# Patient Record
Sex: FEMALE | NOT HISPANIC OR LATINO | Employment: OTHER
[De-identification: names, ages, dates, MRNs, and addresses within clinical notes are randomized per-mention and may not be internally consistent; named-entity substitution may affect disease eponyms.]

---

## 2017-09-17 ENCOUNTER — HEALTH MAINTENANCE LETTER (OUTPATIENT)
Age: 62
End: 2017-09-17

## 2022-10-12 ENCOUNTER — OFFICE VISIT (OUTPATIENT)
Dept: FAMILY MEDICINE | Facility: CLINIC | Age: 67
End: 2022-10-12

## 2022-10-12 VITALS
HEIGHT: 66 IN | DIASTOLIC BLOOD PRESSURE: 71 MMHG | TEMPERATURE: 97.5 F | BODY MASS INDEX: 34.27 KG/M2 | WEIGHT: 213.2 LBS | OXYGEN SATURATION: 96 % | HEART RATE: 67 BPM | RESPIRATION RATE: 16 BRPM | SYSTOLIC BLOOD PRESSURE: 113 MMHG

## 2022-10-12 DIAGNOSIS — R73.03 PREDIABETES: ICD-10-CM

## 2022-10-12 DIAGNOSIS — Z00.00 ENCOUNTER FOR MEDICARE ANNUAL WELLNESS EXAM: Primary | ICD-10-CM

## 2022-10-12 DIAGNOSIS — M79.7 FIBROMYALGIA: ICD-10-CM

## 2022-10-12 DIAGNOSIS — Z11.59 NEED FOR HEPATITIS C SCREENING TEST: ICD-10-CM

## 2022-10-12 DIAGNOSIS — M85.80 OSTEOPENIA, UNSPECIFIED LOCATION: ICD-10-CM

## 2022-10-12 DIAGNOSIS — Z17.0 MALIGNANT NEOPLASM OF OVERLAPPING SITES OF RIGHT BREAST IN FEMALE, ESTROGEN RECEPTOR POSITIVE (H): ICD-10-CM

## 2022-10-12 DIAGNOSIS — M17.0 PRIMARY OSTEOARTHRITIS OF BOTH KNEES: ICD-10-CM

## 2022-10-12 DIAGNOSIS — C50.811 MALIGNANT NEOPLASM OF OVERLAPPING SITES OF RIGHT BREAST IN FEMALE, ESTROGEN RECEPTOR POSITIVE (H): ICD-10-CM

## 2022-10-12 DIAGNOSIS — I70.0 ATHEROSCLEROSIS OF AORTA (H): ICD-10-CM

## 2022-10-12 PROBLEM — E04.1 THYROID NODULE: Status: ACTIVE | Noted: 2017-03-16

## 2022-10-12 PROCEDURE — G0009 ADMIN PNEUMOCOCCAL VACCINE: HCPCS | Mod: 59 | Performed by: FAMILY MEDICINE

## 2022-10-12 PROCEDURE — G0439 PPPS, SUBSEQ VISIT: HCPCS | Performed by: FAMILY MEDICINE

## 2022-10-12 PROCEDURE — 90677 PCV20 VACCINE IM: CPT | Performed by: FAMILY MEDICINE

## 2022-10-12 PROCEDURE — 36415 COLL VENOUS BLD VENIPUNCTURE: CPT | Performed by: FAMILY MEDICINE

## 2022-10-12 PROCEDURE — 99213 OFFICE O/P EST LOW 20 MIN: CPT | Mod: 25 | Performed by: FAMILY MEDICINE
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
## 2022-10-12 NOTE — PATIENT INSTRUCTIONS
Patient Education   Personalized Prevention Plan  You are due for the preventive services outlined below.  Your care team is available to assist you in scheduling these services.  If you have already completed any of these items, please share that information with your care team to update in your medical record.  Health Maintenance Due   Topic Date Due    Osteoporosis Screening  Never done    Discuss Advance Care Planning  Never done    Hepatitis B Vaccine (1 of 3 - 3-dose series) Never done    Colorectal Cancer Screening  Never done    Hepatitis C Screening  Never done    Cholesterol Lab  Never done    LUNG CANCER SCREENING  Never done    FALL RISK ASSESSMENT  Never done    Pneumococcal Vaccine (1 - PCV) Never done    PHQ-2 (once per calendar year)  Never done    COVID-19 Vaccine (3 - Booster for Balbir series) 01/18/2022    Flu Vaccine (1) Never done          - I have referred you to [place] [facility]    - make a future appointment in 1-2 weeks time and we can discuss the pain in your right hand and the metro mobility application
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
## 2022-10-13 LAB
CHOLEST SERPL-MCNC: 161 MG/DL (ref 100–199)
GLUCOSE SERPL-MCNC: 86 MG/DL (ref 70–99)
HCV AB SERPL QL IA: 0.1 S/CO RATIO (ref 0–0.9)
HDLC SERPL-MCNC: 54 MG/DL
LDL/HDL RATIO: 1.8 RATIO (ref 0–3.2)
LDLC SERPL CALC-MCNC: 96 MG/DL (ref 0–99)
TRIGL SERPL-MCNC: 56 MG/DL (ref 0–149)
VLDLC SERPL CALC-MCNC: 11 MG/DL (ref 5–40)

## 2022-10-20 ENCOUNTER — OFFICE VISIT (OUTPATIENT)
Dept: FAMILY MEDICINE | Facility: CLINIC | Age: 67
End: 2022-10-20

## 2022-10-20 VITALS
BODY MASS INDEX: 34.38 KG/M2 | OXYGEN SATURATION: 98 % | DIASTOLIC BLOOD PRESSURE: 68 MMHG | RESPIRATION RATE: 16 BRPM | HEART RATE: 80 BPM | WEIGHT: 213 LBS | SYSTOLIC BLOOD PRESSURE: 115 MMHG

## 2022-10-20 DIAGNOSIS — Z74.09 MOBILITY POOR: ICD-10-CM

## 2022-10-20 DIAGNOSIS — M17.0 PRIMARY OSTEOARTHRITIS OF BOTH KNEES: ICD-10-CM

## 2022-10-20 DIAGNOSIS — M79.7 FIBROMYALGIA: Primary | ICD-10-CM

## 2022-10-20 DIAGNOSIS — Z02.89 ENCOUNTER FOR COMPLETION OF FORM WITH PATIENT: ICD-10-CM

## 2022-10-20 PROCEDURE — 99213 OFFICE O/P EST LOW 20 MIN: CPT | Performed by: FAMILY MEDICINE

## 2022-10-20 NOTE — PROGRESS NOTES
SUBJECTIVE:    Nyeoti Wreh, is a 67 year old female presenting for the below:     1. [employer] mobility paperwork : fibromylagia and bilateral osteoarthritis severe knees.    2. 2 x mechanical falls in last month. Tripped on pavement and in grocery store. No prodrome. Feels mobility is declining. Interested in PT.    OBJECTIVE:  Vitals:    10/20/22 1004   BP: 115/68   Pulse: 80   Resp: 16   SpO2: 98%   Weight: 96.6 kg (213 lb)    Body mass index is 34.38 kg/m .    General: no acute distress, cooperative with exam.  Psych: mental status normal, mood and affect appropriate.    The 10-year ASCVD risk score (Leobardo CHAVEZ, et al., 2019) is: 6.5%    Values used to calculate the score:      Age: 67 years      Sex: Female      Is Non- : No      Diabetic: No      Tobacco smoker: No      Systolic Blood Pressure: 115 mmHg      Is BP treated: No      HDL Cholesterol: 54 mg/dL      Total Cholesterol: 161 mg/dL      ASSESSMENT / PLAN:      Encounter for completion of form with patient   [employer] mobility paperwork completed.     Fibromyalgia  Primary osteoarthritis of both knees  Open to referral to ortho for management options.   -     Physical Therapy Referral; Future  -     Orthopedic  Referral; Future    Mobility poor  Referral for balance and strength training.   -     Physical Therapy Referral; Future

## 2022-10-31 ENCOUNTER — TRANSFERRED RECORDS (OUTPATIENT)
Dept: FAMILY MEDICINE | Facility: CLINIC | Age: 67
End: 2022-10-31

## 2023-01-17 ENCOUNTER — OFFICE VISIT (OUTPATIENT)
Dept: FAMILY MEDICINE | Facility: CLINIC | Age: 68
End: 2023-01-17

## 2023-01-17 VITALS
HEART RATE: 72 BPM | WEIGHT: 215.2 LBS | SYSTOLIC BLOOD PRESSURE: 117 MMHG | OXYGEN SATURATION: 99 % | DIASTOLIC BLOOD PRESSURE: 70 MMHG | BODY MASS INDEX: 34.73 KG/M2

## 2023-01-17 DIAGNOSIS — K58.1 IRRITABLE BOWEL SYNDROME WITH CONSTIPATION: Primary | ICD-10-CM

## 2023-01-17 DIAGNOSIS — Z17.0 MALIGNANT NEOPLASM OF OVERLAPPING SITES OF RIGHT BREAST IN FEMALE, ESTROGEN RECEPTOR POSITIVE (H): ICD-10-CM

## 2023-01-17 DIAGNOSIS — M17.0 PRIMARY OSTEOARTHRITIS OF BOTH KNEES: ICD-10-CM

## 2023-01-17 DIAGNOSIS — I70.0 ATHEROSCLEROSIS OF AORTA (H): ICD-10-CM

## 2023-01-17 DIAGNOSIS — M79.661 PAIN OF RIGHT LOWER LEG: ICD-10-CM

## 2023-01-17 DIAGNOSIS — C50.811 MALIGNANT NEOPLASM OF OVERLAPPING SITES OF RIGHT BREAST IN FEMALE, ESTROGEN RECEPTOR POSITIVE (H): ICD-10-CM

## 2023-01-17 LAB — D-DIMER: 152 NG/ML (ref 0–300)

## 2023-01-17 PROCEDURE — 36415 COLL VENOUS BLD VENIPUNCTURE: CPT | Performed by: FAMILY MEDICINE

## 2023-01-17 PROCEDURE — 99214 OFFICE O/P EST MOD 30 MIN: CPT | Performed by: FAMILY MEDICINE

## 2023-01-17 PROCEDURE — 85379 FIBRIN DEGRADATION QUANT: CPT | Performed by: FAMILY MEDICINE

## 2023-01-17 RX ORDER — ATORVASTATIN CALCIUM 20 MG/1
20 TABLET, FILM COATED ORAL DAILY
Qty: 90 TABLET | Refills: 3 | Status: SHIPPED | OUTPATIENT
Start: 2023-01-17 | End: 2023-04-17

## 2023-01-17 NOTE — PROGRESS NOTES
SUBJECTIVE:    Nyeoti Wreh, is a 67 year old female with fibromyalgia, breast cancer and bilateral osteoarthritis severe knees presenting for the below:     1. Gradual onset of bilateral leg heaviness, worse on the right, with swelling and pain while returning on a long haul flight from [facility] 1 week ago.  Denies associated shortness of breath, palpitations. Denies any  fevers or chills.  Denies PND or orthopia.     2. Feels IBS symptoms are flaring with constipation. Requesting referral to gastroenterology.     OBJECTIVE:  Vitals:    01/17/23 0830   BP: 117/70   Pulse: 72   SpO2: 99%   Weight: 97.6 kg (215 lb 3.2 oz)    Body mass index is 34.73 kg/m .    General: no acute distress, cooperative with exam.  Lungs: clear to auscultation bilaterally, normal respiratory effort.  Heart: regular rate, normal S1 and S2, no murmurs.   Extremities: warm, perfused, 1+ pitting edema to bilateral ankles.   Neuro: grossly intact   Psych: mental status normal, mood and affect appropriate.    D:dimer :152 (less than 300)    ASSESSMENT / PLAN:      Malignant neoplasm of overlapping sites of right breast in female, estrogen receptor positive (H)  Primary osteoarthritis of both knees  Pain of right lower leg  D dimer below threshold. No other signs or symptoms of CHF. Most in keeping with dependent edema related to long flight. Encouraged to elevated legs and wear compression stockings she has a home.   -     D-Dimer ([facility])  -     VENOUS COLLECTION    Irritable bowel syndrome with constipation  -     Adult GI  Referral - Consult Only; Future    Atherosclerosis of aorta (H)  Not currently taking statin. Stopped taking statin due to improvement in cholesterol October 2022. No PMHx of CVA, MI. The 10-year ASCVD risk score (Leobardo DK, et al., 2019) is: 6.7%. open to restarting.   Nyeoti was seen today for musculoskeletal problem.  -     atorvastatin (LIPITOR) 20 MG tablet; Take 1 tablet (20 mg) by mouth daily for 90  days

## 2023-06-08 DIAGNOSIS — J30.2 SEASONAL ALLERGIC RHINITIS: ICD-10-CM

## 2023-06-08 DIAGNOSIS — H10.13 ALLERGIC CONJUNCTIVITIS, BILATERAL: ICD-10-CM

## 2023-06-08 RX ORDER — IBUPROFEN 800 MG/1
800 TABLET, FILM COATED ORAL 2 TIMES DAILY
Qty: 180 TABLET | Refills: 1 | OUTPATIENT
Start: 2023-06-08

## 2023-06-08 NOTE — TELEPHONE ENCOUNTER
Please assist scheduling telephone / video appointment to address long term use of ibuprofen and potential issues (and alternatives).    Thank you!

## 2023-06-14 ENCOUNTER — VIRTUAL VISIT (OUTPATIENT)
Dept: FAMILY MEDICINE | Facility: CLINIC | Age: 68
End: 2023-06-14

## 2023-06-14 DIAGNOSIS — M79.7 FIBROMYALGIA: Primary | ICD-10-CM

## 2023-06-14 DIAGNOSIS — M17.0 PRIMARY OSTEOARTHRITIS OF BOTH KNEES: ICD-10-CM

## 2023-06-14 RX ORDER — IBUPROFEN 800 MG/1
TABLET, FILM COATED ORAL
COMMUNITY
Start: 2023-03-18

## 2023-06-14 RX ORDER — ESOMEPRAZOLE MAGNESIUM 40 MG/1
40 CAPSULE, DELAYED RELEASE ORAL
Qty: 90 CAPSULE | Refills: 3 | Status: SHIPPED | OUTPATIENT
Start: 2023-06-14

## 2023-06-14 RX ORDER — IBUPROFEN 800 MG/1
800 TABLET, FILM COATED ORAL EVERY 12 HOURS
Qty: 120 TABLET | Refills: 0 | Status: SHIPPED | OUTPATIENT
Start: 2023-06-14 | End: 2023-08-13

## 2023-06-14 NOTE — PROGRESS NOTES
Length of phone call: 18 minutes    Patient location:  Home    Provider location:  [facility]     Mode of Communication:  Telephone    This was a telephone visit conducted during COVID-19 outbreak in regulation with social distancing and quarantine recommendations of the CDC and [place] department of health and human services.     SUBJECTIVE:                                                 Nyeoti Wreh is a 68 year old female seen via telephone visit for the following health issues :    Fibromylagia and bilateral osteoarthritis severe knees. Intolerable side effects of fatigue and somnolence with gabapentin. Historically declines knee replacements and continues to do so. Has tried cortisone shots with minimal effect. Declines further PT.     Now taking ibuprofen 800 mg TID.     OBJECTIVE:                                                    No vitals taken due to telehealth visit       ASSESSMENT/PLAN:                                                      Fibromyalgia  Primary osteoarthritis of both knees    Discussed risk of GI bleed and effect on kidney health with chronic NSAID use. Patient voices understanding.   Open to additional of PPI and referral to pain management     -reduce to taking ibuprofen 800 mg BID  -restart taking Nexium 40 mg daily for gastro protective effect   -lets get you to a pain specialist to assist with alternative NSAID saving chronic pain modalities.     -     esomeprazole (NEXIUM) 40 MG DR capsule; Take 1 capsule (40 mg) by mouth every morning (before breakfast) Take 30-60 minutes before eating.  -     Pain Management  Referral; Future  -     ibuprofen (ADVIL/MOTRIN) 800 MG tablet; Take 1 tablet (800 mg) by mouth every 12 hours for 60 days

## 2023-06-16 DIAGNOSIS — M79.7 FIBROMYALGIA: ICD-10-CM

## 2023-06-16 DIAGNOSIS — M17.0 PRIMARY OSTEOARTHRITIS OF BOTH KNEES: ICD-10-CM

## 2023-06-16 RX ORDER — IBUPROFEN 800 MG/1
800 TABLET, FILM COATED ORAL EVERY 12 HOURS
Qty: 120 TABLET | Refills: 1 | OUTPATIENT
Start: 2023-06-16

## 2023-06-16 NOTE — TELEPHONE ENCOUNTER
Ibuprofen 800 mg    LOV 6/14/23 VV  Last labs 1/17/23    Last notes    Discussed risk of GI bleed and effect on kidney health with chronic NSAID use. Patient voices understanding.   Open to additional of PPI and referral to pain management      -reduce to taking ibuprofen 800 mg BID  -restart taking Nexium 40 mg daily for gastro protective effect   -lets get you to a pain specialist to assist with alternative NSAID saving chronic pain modalities    No future appt yet

## 2023-08-11 ENCOUNTER — OFFICE VISIT (OUTPATIENT)
Dept: URGENT CARE | Facility: URGENT CARE | Age: 68
End: 2023-08-11
Payer: COMMERCIAL

## 2023-08-11 VITALS
BODY MASS INDEX: 34.54 KG/M2 | HEART RATE: 93 BPM | RESPIRATION RATE: 15 BRPM | DIASTOLIC BLOOD PRESSURE: 81 MMHG | OXYGEN SATURATION: 98 % | SYSTOLIC BLOOD PRESSURE: 126 MMHG | TEMPERATURE: 98.1 F | WEIGHT: 214 LBS

## 2023-08-11 DIAGNOSIS — L02.212 ABSCESS OF BACK: Primary | ICD-10-CM

## 2023-08-11 PROCEDURE — 99213 OFFICE O/P EST LOW 20 MIN: CPT | Mod: 25 | Performed by: NURSE PRACTITIONER

## 2023-08-11 PROCEDURE — 87077 CULTURE AEROBIC IDENTIFY: CPT | Performed by: NURSE PRACTITIONER

## 2023-08-11 PROCEDURE — 87070 CULTURE OTHR SPECIMN AEROBIC: CPT | Performed by: NURSE PRACTITIONER

## 2023-08-11 PROCEDURE — 87186 SC STD MICRODIL/AGAR DIL: CPT | Performed by: NURSE PRACTITIONER

## 2023-08-11 PROCEDURE — 10060 I&D ABSCESS SIMPLE/SINGLE: CPT | Performed by: NURSE PRACTITIONER

## 2023-08-11 RX ORDER — SULFAMETHOXAZOLE/TRIMETHOPRIM 800-160 MG
1 TABLET ORAL 2 TIMES DAILY
Qty: 14 TABLET | Refills: 0 | Status: SHIPPED | OUTPATIENT
Start: 2023-08-11 | End: 2023-08-18

## 2023-08-11 ASSESSMENT — ENCOUNTER SYMPTOMS
FEVER: 0
FATIGUE: 0
WOUND: 1

## 2023-08-11 NOTE — PATIENT INSTRUCTIONS
Take antibiotic as prescribed. We will call you if your culture comes back showing that we need to change your antibiotic. For next 2-3 days change dressing once daily or if soiled. If you develop worsening redness / swelling / pain return for reevaluation.
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
## 2023-08-14 LAB — BACTERIA ABSC ANAEROBE+AEROBE CULT: ABNORMAL

## 2023-08-16 NOTE — TELEPHONE ENCOUNTER
IBUPROFEN 800mg     LOV 6/14/23 VV  Last labs 1/17/23   Next appt: none     *MED REFILL DENIED BY AM 6/16/23      Discussed risk of GI bleed and effect on kidney health with chronic NSAID use. Patient voices understanding.   Open to additional of PPI and referral to pain management      -reduce to taking ibuprofen 800 mg BID  -restart taking Nexium 40 mg daily for gastro protective effect   -lets get you to a pain specialist to assist with alternative NSAID saving chronic pain modalities

## 2023-08-17 RX ORDER — IBUPROFEN 800 MG/1
800 TABLET, FILM COATED ORAL 2 TIMES DAILY
OUTPATIENT
Start: 2023-08-17

## 2024-04-25 ENCOUNTER — TELEPHONE (OUTPATIENT)
Dept: FAMILY MEDICINE | Facility: CLINIC | Age: 69
End: 2024-04-25

## 2024-08-26 ENCOUNTER — OFFICE VISIT (OUTPATIENT)
Dept: FAMILY MEDICINE | Facility: CLINIC | Age: 69
End: 2024-08-26

## 2024-08-26 VITALS
OXYGEN SATURATION: 99 % | SYSTOLIC BLOOD PRESSURE: 117 MMHG | BODY MASS INDEX: 35.03 KG/M2 | HEART RATE: 67 BPM | DIASTOLIC BLOOD PRESSURE: 62 MMHG | WEIGHT: 218 LBS | HEIGHT: 66 IN

## 2024-08-26 DIAGNOSIS — Z00.00 ENCOUNTER FOR MEDICARE ANNUAL WELLNESS EXAM: Primary | ICD-10-CM

## 2024-08-26 DIAGNOSIS — M85.80 OSTEOPENIA, UNSPECIFIED LOCATION: ICD-10-CM

## 2024-08-26 DIAGNOSIS — E66.01 CLASS 2 SEVERE OBESITY DUE TO EXCESS CALORIES WITH SERIOUS COMORBIDITY AND BODY MASS INDEX (BMI) OF 35.0 TO 35.9 IN ADULT (H): ICD-10-CM

## 2024-08-26 DIAGNOSIS — Z13.29 SCREENING FOR ENDOCRINE, METABOLIC AND IMMUNITY DISORDER: ICD-10-CM

## 2024-08-26 DIAGNOSIS — M17.0 PRIMARY OSTEOARTHRITIS OF BOTH KNEES: ICD-10-CM

## 2024-08-26 DIAGNOSIS — Z12.11 SCREENING FOR MALIGNANT NEOPLASM OF COLON: ICD-10-CM

## 2024-08-26 DIAGNOSIS — Z13.228 SCREENING FOR ENDOCRINE, METABOLIC AND IMMUNITY DISORDER: ICD-10-CM

## 2024-08-26 DIAGNOSIS — M79.7 FIBROMYALGIA: ICD-10-CM

## 2024-08-26 DIAGNOSIS — E11.9 TYPE 2 DIABETES MELLITUS WITHOUT COMPLICATION, WITHOUT LONG-TERM CURRENT USE OF INSULIN (H): ICD-10-CM

## 2024-08-26 DIAGNOSIS — I70.0 ATHEROSCLEROSIS OF AORTA (H): ICD-10-CM

## 2024-08-26 DIAGNOSIS — D05.12 DUCTAL CARCINOMA IN SITU (DCIS) OF LEFT BREAST: ICD-10-CM

## 2024-08-26 DIAGNOSIS — Z13.6 SCREENING FOR CARDIOVASCULAR CONDITION: ICD-10-CM

## 2024-08-26 DIAGNOSIS — Z13.0 SCREENING FOR ENDOCRINE, METABOLIC AND IMMUNITY DISORDER: ICD-10-CM

## 2024-08-26 DIAGNOSIS — C50.811 MALIGNANT NEOPLASM OF OVERLAPPING SITES OF RIGHT FEMALE BREAST, UNSPECIFIED ESTROGEN RECEPTOR STATUS (H): ICD-10-CM

## 2024-08-26 DIAGNOSIS — E66.812 CLASS 2 SEVERE OBESITY DUE TO EXCESS CALORIES WITH SERIOUS COMORBIDITY AND BODY MASS INDEX (BMI) OF 35.0 TO 35.9 IN ADULT (H): ICD-10-CM

## 2024-08-26 LAB
% GRANULOCYTES: 57.7 % (ref 42.2–75.2)
ANION GAP SERPL CALCULATED.3IONS-SCNC: 20 MMOL/L (ref 7–15)
BUN SERPL-MCNC: 17.2 MG/DL (ref 8–23)
CALCIUM SERPL-MCNC: 9.2 MG/DL (ref 8.8–10.4)
CHLORIDE SERPL-SCNC: 103 MMOL/L (ref 98–107)
CREAT SERPL-MCNC: 0.66 MG/DL (ref 0.51–0.95)
EGFRCR SERPLBLD CKD-EPI 2021: >90 ML/MIN/1.73M2
FASTING STATUS PATIENT QL REPORTED: YES
GLUCOSE SERPL-MCNC: 69 MG/DL (ref 70–99)
HBA1C MFR BLD: 5.8 % (ref 4–6)
HCO3 SERPL-SCNC: 16 MMOL/L (ref 22–29)
HCT VFR BLD AUTO: 39 % (ref 35–46)
HEMOGLOBIN: 12.7 G/DL (ref 11.8–15.5)
LYMPHOCYTES NFR BLD AUTO: 35.4 % (ref 20.5–51.1)
MCH RBC QN AUTO: 26.2 PG (ref 27–31)
MCHC RBC AUTO-ENTMCNC: 32.6 G/DL (ref 33–37)
MCV RBC AUTO: 80.3 FL (ref 80–100)
MONOCYTES NFR BLD AUTO: 6.9 % (ref 1.7–9.3)
PLATELET # BLD AUTO: 165 K/UL (ref 140–450)
POTASSIUM SERPL-SCNC: 3.9 MMOL/L (ref 3.4–5.3)
RBC # BLD AUTO: 4.85 X10/CMM (ref 3.7–5.2)
SODIUM SERPL-SCNC: 139 MMOL/L (ref 135–145)
WBC # BLD AUTO: 4.7 X10/CMM (ref 3.8–11)

## 2024-08-26 PROCEDURE — G0439 PPPS, SUBSEQ VISIT: HCPCS

## 2024-08-26 PROCEDURE — 85025 COMPLETE CBC W/AUTO DIFF WBC: CPT

## 2024-08-26 PROCEDURE — 83036 HEMOGLOBIN GLYCOSYLATED A1C: CPT | Mod: 90

## 2024-08-26 PROCEDURE — 80048 BASIC METABOLIC PNL TOTAL CA: CPT | Mod: ORL

## 2024-08-26 PROCEDURE — 36415 COLL VENOUS BLD VENIPUNCTURE: CPT

## 2024-08-26 PROCEDURE — 80061 LIPID PANEL: CPT | Mod: ORL

## 2024-08-26 SDOH — HEALTH STABILITY: PHYSICAL HEALTH: ON AVERAGE, HOW MANY MINUTES DO YOU ENGAGE IN EXERCISE AT THIS LEVEL?: 60 MIN

## 2024-08-26 SDOH — HEALTH STABILITY: PHYSICAL HEALTH: ON AVERAGE, HOW MANY DAYS PER WEEK DO YOU ENGAGE IN MODERATE TO STRENUOUS EXERCISE (LIKE A BRISK WALK)?: 7 DAYS

## 2024-08-26 ASSESSMENT — SOCIAL DETERMINANTS OF HEALTH (SDOH): HOW OFTEN DO YOU GET TOGETHER WITH FRIENDS OR RELATIVES?: THREE TIMES A WEEK

## 2024-08-26 NOTE — LETTER
September 4, 2024          Nyeoti Wreh  [address]   [facility] [address]        Hi Nyeoti,     I have summarized your lab results below.     Your BMP and CBC lab results contain results that are outside of the normal range, I have reviewed each of these results and they require no changes at this time.     Lipid profile including cholesterol results were normal.     Your A1c (or the measure of your glucose control over the last 3 months) is less than 7% which indicates excellent glucose control.      Please let me know if you have questions or concerns. Thank you for trusting me with your care!     Marge Bazan, CHEN CNP     Resulted Orders   CBC with Diff/Plt ([facility])   Result Value Ref Range    WBC x10/cmm 4.7 3.8 - 11.0 x10/cmm    % Lymphocytes 35.4 20.5 - 51.1 %    % Monocytes 6.9 1.7 - 9.3 %    % Granulocytes 57.7 42.2 - 75.2 %    RBC x10/cmm 4.85 3.7 - 5.2 x10/cmm    Hemoglobin 12.7 11.8 - 15.5 g/dl    Hematocrit 39.0 35 - 46 %    MCV 80.3 80 - 100 fL    MCH 26.2 (A) 27.0 - 31.0 pg    MCHC 32.6 (A) 33.0 - 37.0 g/dL    Platelet Count 165 140 - 450 K/uL   Basic metabolic panel   Result Value Ref Range    Sodium 139 135 - 145 mmol/L    Potassium 3.9 3.4 - 5.3 mmol/L    Chloride 103 98 - 107 mmol/L    Carbon Dioxide (CO2) 16 (L) 22 - 29 mmol/L    Anion Gap 20 (H) 7 - 15 mmol/L    Urea Nitrogen 17.2 8.0 - 23.0 mg/dL    Creatinine 0.66 0.51 - 0.95 mg/dL    GFR Estimate >90 >60 mL/min/1.73m2      Comment:      eGFR calculated using 2021 CKD-EPI equation.    Calcium 9.2 8.8 - 10.4 mg/dL      Comment:      Reference intervals for this test were updated on 7/16/2024 to reflect our healthy population more accurately. There may be differences in the flagging of prior results with similar values performed with this method. Those prior results can be interpreted in the context of the updated reference intervals.    Glucose 69 (L) 70 - 99 mg/dL    Patient Fasting > 8hrs? Yes    Hemoglobin A1C ([facility])   Result Value  Ref Range    Hemoglobin A1C 5.8 4.0 - 6.0 %   Lipid Profile   Result Value Ref Range    Cholesterol 133 <200 mg/dL    Triglycerides 40 <150 mg/dL    Direct Measure HDL 56 >=50 mg/dL    LDL Cholesterol Calculated 69 <=100 mg/dL    Non HDL Cholesterol 77 <130 mg/dL    Patient Fasting > 8hrs? Yes     Narrative    Cholesterol  Desirable:  <200 mg/dL    Triglycerides  Normal:  Less than 150 mg/dL  Borderline High:  150-199 mg/dL  High:  200-499 mg/dL  Very High:  Greater than or equal to 500 mg/dL    Direct Measure HDL  Female:  Greater than or equal to 50 mg/dL   Male:  Greater than or equal to 40 mg/dL    LDL Cholesterol  Desirable:  <100mg/dL  Above Desirable:  100-129 mg/dL   Borderline High:  130-159 mg/dL   High:  160-189 mg/dL   Very High:  >= 190 mg/dL    Non HDL Cholesterol  Desirable:  130 mg/dL  Above Desirable:  130-159 mg/dL  Borderline High:  160-189 mg/dL  High:  190-219 mg/dL  Very High:  Greater than or equal to 220 mg/dL

## 2024-08-26 NOTE — PATIENT INSTRUCTIONS
Cristina Baumann: [facility]  Phone #: [phone]    Patient Education   Preventive Care Advice   This is general advice given by our system to help you stay healthy. However, your care team may have specific advice just for you. Please talk to your care team about your preventive care needs.  Nutrition  Eat 5 or more servings of fruits and vegetables each day.  Try wheat bread, brown rice and whole grain pasta (instead of white bread, rice, and pasta).  Get enough calcium and vitamin D. Check the label on foods and aim for 100% of the RDA (recommended daily allowance).  Lifestyle  Exercise at least 150 minutes each week  (30 minutes a day, 5 days a week).  Do muscle strengthening activities 2 days a week. These help control your weight and prevent disease.  No smoking.  Wear sunscreen to prevent skin cancer.  Have a dental exam and cleaning every 6 months.  Yearly exams  See your health care team every year to talk about:  Any changes in your health.  Any medicines your care team has prescribed.  Preventive care, family planning, and ways to prevent chronic diseases.  Shots (vaccines)   HPV shots (up to age 26), if you've never had them before.  Hepatitis B shots (up to age 59), if you've never had them before.  COVID-19 shot: Get this shot when it's due.  Flu shot: Get a flu shot every year.  Tetanus shot: Get a tetanus shot every 10 years.  Pneumococcal, hepatitis A, and RSV shots: Ask your care team if you need these based on your risk.  Shingles shot (for age 50 and up)  General health tests  Diabetes screening:  Starting at age 35, Get screened for diabetes at least every 3 years.  If you are younger than age 35, ask your care team if you should be screened for diabetes.  Cholesterol test: At age 39, start having a cholesterol test every 5 years, or more often if advised.  Bone density scan (DEXA): At age 50, ask your care team if you should have this scan for osteoporosis (brittle  bones).  Hepatitis C: Get tested at least once in your life.  STIs (sexually transmitted infections)  Before age 24: Ask your care team if you should be screened for STIs.  After age 24: Get screened for STIs if you're at risk. You are at risk for STIs (including HIV) if:  You are sexually active with more than one person.  You don't use condoms every time.  You or a partner was diagnosed with a sexually transmitted infection.  If you are at risk for HIV, ask about PrEP medicine to prevent HIV.  Get tested for HIV at least once in your life, whether you are at risk for HIV or not.  Cancer screening tests  Cervical cancer screening: If you have a cervix, begin getting regular cervical cancer screening tests starting at age 21.  Breast cancer scan (mammogram): If you've ever had breasts, begin having regular mammograms starting at age 40. This is a scan to check for breast cancer.  Colon cancer screening: It is important to start screening for colon cancer at age 45.  Have a colonoscopy test every 10 years (or more often if you're at risk) Or, ask your provider about stool tests like a FIT test every year or Cologuard test every 3 years.  To learn more about your testing options, visit:   .  For help making a decision, visit:   https://bit.ly/nr92516.  Prostate cancer screening test: If you have a prostate, ask your care team if a prostate cancer screening test (PSA) at age 55 is right for you.  Lung cancer screening: If you are a current or former smoker ages 50 to 80, ask your care team if ongoing lung cancer screenings are right for you.  For informational purposes only. Not to replace the advice of your health care provider. Copyright   2023 [place] [employer]. All rights reserved. Clinically reviewed by the [facility]. vushaper [id number] - REV 01/24.

## 2024-08-26 NOTE — PROGRESS NOTES
Preventive Care Visit  [facility]  CHEN Lopes CNP, Family Medicine  Aug 26, 2024      Assessment & Plan     Encounter for Medicare annual wellness exam  Age-appropriate preventative health maintenance along with diet, exercise and healthy weight discussed.     Class 2 severe obesity due to excess calories with serious comorbidity and body mass index (BMI) of 35.0 to 35.9 in adult (H)  Discussed importance of a healthy weight, along with diet and exercise     Malignant neoplasm of overlapping sites of right female breast, unspecified estrogen receptor status (H)  Ductal carcinoma in situ (DCIS) of left breast  MR of breasts bilaterally 9/1/22 showed Biopsy-proven right breast carcinoma at 12:00 measuring 1.9 x 1.8 x 1.6 cm, Biopsy-proven right breast carcinoma at 9:00 which is best measured on previous diagnostic ultrasound which indicated a measurement of 0.6 x 0.5 x 0.5 cm and indeterminate left breast mass at 1:00 with ultrasound-guided biopsy follow up recommended. Ductal carcinoma in situ (DCIS) of left breast seen on follow up biopsy on 11/7/22.  Patient previously referred and followed with [facility]. Was not completely satisfied with care and plans the providers recommended. Has not had follow up since right lymph node biopsy was negative for metastatic carcinoma. Will place referral for [facility]. Patient agreeable to plan. All questions answered.   - Breast Provider  Referral - To a [facility] Location (Use POS/Location)  - Care Coordination referral    Osteopenia, unspecified location  Last DEXA 1/2015. Due follow up DEXA   - DX Bone Density    Primary osteoarthritis of both knees  Fibromyalgia  Taking Ibuprofen 800 mg as needed, Nexium for GI protective effect and Calcium and Vit D. Reviewed kidney health with NSAID use. Pain management referral previously placed to assist with alternative NSAID. Will place referral again as patient has not had  "improvement in pain. Patient agreeable to plan. All questions answered.   - Pain Management Referral - To a [facility] Location (Use POS/Location)    Atherosclerosis of aorta (H24)  Not on medication. Stopped taking statin. Reviewed ASCVD risk score. Will review lipid results when return and discussed consideration of restarting a statin.     Type 2 diabetes without complication and without long term use of insulin   Hx of elevated A1c of 6.7% (6/8/17) and 7.1% (4/24/18). Not on medication. A1c of 5.8 today. Reviewed healthy diet and exercise.   - VENOUS COLLECTION  - Basic metabolic panel  - Basic metabolic panel  - Hemoglobin A1C ([facility])    Screening for endocrine, metabolic and immunity disorder  - CBC with Diff/Plt ([facility])  - VENOUS COLLECTION  - Basic metabolic panel  - Basic metabolic panel    Screening for malignant neoplasm of colon  - Colonoscopy Screening  Referral    Screening for cardiovascular condition  - VENOUS COLLECTION  - Lipid Profile  - Lipid Profile         Patient has been advised of split billing requirements and indicates understanding: Yes        BMI  Estimated body mass index is 35.19 kg/m  as calculated from the following:    Height as of this encounter: 1.676 m (5' 6\").    Weight as of this encounter: 98.9 kg (218 lb).   Weight management plan: Discussed healthy diet and exercise guidelines    Counseling  Appropriate preventive services were addressed with this patient via screening, questionnaire, or discussion as appropriate for fall prevention, nutrition, physical activity, Tobacco-use cessation, social engagement, weight loss and cognition.  Checklist reviewing preventive services available has been given to the patient.  Reviewed patient's diet, addressing concerns and/or questions.   Updated plan of care.  Patient reported difficulty with activities of daily living were addressed today.    Work on weight loss  Regular exercise  See Patient Instructions    No " follow-ups on file.    Subjective   Nyeoti is a 69 year old, presenting for the following:  Physical (Fasting/Would like to switch Ibuprofen 800mg prescription (for arthritis) and Miralax to us due to other prescriber not getting it in quickly enough /) and Health Maintenance (Colonoscopy: Will Schedule, needs referral /Mammo: Not willing /Wants CBC checked and Iron levels )          Health Care Directive  Patient does not have a Health Care Directive or Living Will: Discussed advance care planning with patient; however, patient declined at this time.    HPI      HLD: Not taking Atorvastatin 20 mg since improvement in cholesterol. Declines statin today.     Fibro/Osteoarthritis: Ibuprofen 800 mg as needed and Calcium and Vit D. Nexium for protective effect 30 mins before eating. Does not want a knee replacement. Tries walking. Competed shots a month again at a pain clinic. Reports the shots in her knees never helped. Also did a gel. Takes Ibuprofen when pain is too much. Will also try rubs.     Stopped statin medication due to improved cholesterol     Right breast cancer diagnosis in 2022: Not currently following with [facility]. Went to [facility] last year. Wasn't satisfied with care. Does not want to do chemo or surgery. Early detection and said insurance wouldn't cover it. Went thought all the tests and just wanted lump out. Has seen Cristina Baumann    Health maintenance  Colonoscopy-last year. Due again this year: 10/4/22 : inadequate bowel prep. Return in 1 year.    Mammo: declines. Wont do it.   Due for follow up dexa          8/26/2024   General Health   How would you rate your overall physical health? Good   Feel stress (tense, anxious, or unable to sleep) Not at all            8/26/2024   Nutrition   Diet: Carbohydrate counting    Breakfast skipped    Gluten-free/reduced       Multiple values from one day are sorted in reverse-chronological order         8/26/2024   Exercise   Days per  week of moderate/strenous exercise 7 days   Average minutes spent exercising at this level 60 min            8/26/2024   Social Factors   Frequency of gathering with friends or relatives Three times a week   Worry food won't last until get money to buy more No   Food not last or not have enough money for food? No   Do you have housing? (Housing is defined as stable permanent housing and does not include staying ouside in a car, in a tent, in an abandoned building, in an overnight shelter, or couch-surfing.) Yes   Are you worried about losing your housing? No   Lack of transportation? Patient declined   Unable to get utilities (heat,electricity)? No            8/26/2024   Fall Risk   Fallen 2 or more times in the past year? No   Trouble with walking or balance? Yes              8/26/2024   Activities of Daily Living- Home Safety   Needs help with the following daily activites Transportation    Shopping    Housework   Safety concerns in the home None of the above       Multiple values from one day are sorted in reverse-chronological order         8/26/2024   Dental   Dentist two times every year? Yes            8/26/2024   Hearing Screening   Hearing concerns? None of the above            8/26/2024   Driving Risk Screening   Patient/family members have concerns about driving (!) DECLINE            8/26/2024   General Alertness/Fatigue Screening   Have you been more tired than usual lately? No            8/26/2024   Urinary Incontinence Screening   Bothered by leaking urine in past 6 months No            8/26/2024   TB Screening   Were you born outside of the [place]? Yes            Today's PHQ-2 Score:       8/26/2024    10:18 AM   PHQ-2 ( 1999 [employer])   Q1: Little interest or pleasure in doing things 0   Q2: Feeling down, depressed or hopeless 0   PHQ-2 Score 0           8/26/2024   Substance Use   Alcohol more than 3/day or more than 7/wk Not Applicable   Do you have a current opioid prescription? No   How severe/bad is  pain from 1 to 10? 10/10   Do you use any other substances recreationally? (!) OTHER        Social History     Tobacco Use    Smoking status: Former     Types: Cigarettes    Smokeless tobacco: Never   Substance Use Topics    Alcohol use: Never    Drug use: Never          Mammogram Screening - Mammography discussed and declined    ASCVD Risk   The 10-year ASCVD risk score (Leobardo CHAVEZ, et al., 2019) is: 8.5%    Values used to calculate the score:      Age: 69 years      Sex: Female      Is Non- : No      Diabetic: No      Tobacco smoker: No      Systolic Blood Pressure: 117 mmHg      Is BP treated: No      HDL Cholesterol: 54 mg/dL      Total Cholesterol: 161 mg/dL          Reviewed and updated as needed this visit by Provider   Tobacco  Allergies  Meds  Problems  Med Hx  Surg Hx             History reviewed. No pertinent past medical history.  History reviewed. No pertinent surgical history.  Lab work is in process  Current providers sharing in care for this patient include:  Patient Care Team:  Nimo Quintana MD as PCP - General (Family Medicine)  Nimo Quintana MD as Assigned PCP    The following health maintenance items are reviewed in Epic and correct as of today:  Health Maintenance   Topic Date Due    LUNG CANCER SCREENING  Never done    RSV VACCINE (Pregnancy & 60+) (1 - 1-dose 60+ series) Never done    DEXA  01/20/2018    MAMMO SCREENING  08/22/2023    COVID-19 Vaccine (3 - 2023-24 season) 09/01/2023    COLORECTAL CANCER SCREENING  02/22/2024    DTAP/TDAP/TD IMMUNIZATION (2 - Td or Tdap) 07/24/2024    INFLUENZA VACCINE (1) 09/01/2024    MEDICARE ANNUAL WELLNESS VISIT  08/26/2025    FALL RISK ASSESSMENT  08/26/2025    GLUCOSE  10/12/2025    LIPID  10/12/2027    ADVANCE CARE PLANNING  08/26/2029    HEPATITIS C SCREENING  Completed    PHQ-2 (once per calendar year)  Completed    Pneumococcal Vaccine: 65+ Years  Completed    ZOSTER IMMUNIZATION  Completed    HPV  "IMMUNIZATION  Aged Out    MENINGITIS IMMUNIZATION  Aged Out    RSV MONOCLONAL ANTIBODY  Aged Out         Review of Systems  Constitutional, HEENT, cardiovascular, pulmonary, GI, , musculoskeletal, neuro, skin, endocrine and psych systems are negative, except as otherwise noted.     Objective    Exam  /62   Pulse 67   Ht 1.676 m (5' 6\")   Wt 98.9 kg (218 lb)   SpO2 99%   BMI 35.19 kg/m     Estimated body mass index is 35.19 kg/m  as calculated from the following:    Height as of this encounter: 1.676 m (5' 6\").    Weight as of this encounter: 98.9 kg (218 lb).    Physical Exam           8/26/2024   Mini Cog   Clock Draw Score 2 Normal   3 Item Recall 3 objects recalled   Mini Cog Total Score 5      Hearing    Left:  500Hz: Pass  1000Hz: Pass  2000Hz: Pass  4000Hz: Pass    Right:  500Hz: Pass  1000Hz: Pass  2000Hz: Pass  4000Hz: Pass             Signed Electronically by: Marge Bazan, APRN CNP    "

## 2024-08-27 ENCOUNTER — PATIENT OUTREACH (OUTPATIENT)
Dept: CARE COORDINATION | Facility: CLINIC | Age: 69
End: 2024-08-27
Payer: COMMERCIAL

## 2024-08-27 PROBLEM — E11.9 TYPE 2 DIABETES MELLITUS WITHOUT COMPLICATION, WITHOUT LONG-TERM CURRENT USE OF INSULIN (H): Status: ACTIVE | Noted: 2024-08-27

## 2024-08-27 LAB
CHOLEST SERPL-MCNC: 133 MG/DL
FASTING STATUS PATIENT QL REPORTED: YES
HDLC SERPL-MCNC: 56 MG/DL
LDLC SERPL CALC-MCNC: 69 MG/DL
NONHDLC SERPL-MCNC: 77 MG/DL
TRIGL SERPL-MCNC: 40 MG/DL

## 2024-08-27 NOTE — PROGRESS NOTES
Clinic Care Coordination-- Chart review    Patient identified by clinic staff for care management outreach, however, patient is not part of a value-based contact therefore outreach cannot be completed. DELLA JIN provided resources and recommendations directly to clinic staff who will follow up with patient appropriately. No further action from DELLA JIN at this time.     MIGUEL ANGEL Bonner, [facility]  Clinic Care Coordinator  [email or web address]  [phone]

## 2024-09-17 ENCOUNTER — TELEPHONE (OUTPATIENT)
Dept: FAMILY MEDICINE | Facility: CLINIC | Age: 69
End: 2024-09-17

## 2024-09-17 NOTE — TELEPHONE ENCOUNTER
Spoke with patient. She refuses to schedule any appointments until after her travel. She did not want the information for [facility] breast center. Patient wanted more clarification on her lab results from me and nothing else at the moment. She will call back after her travel if she wants breast center information. Closing encounter.

## 2024-09-17 NOTE — TELEPHONE ENCOUNTER
Tanvi Raygoza, Leeanne Raya RN Hi Emily,  This one has been in our box for a bit.  My intention has been to give her information to contact [facility] [place]. With her [employer], they should be in her network.  Then we need to fax them referral and records last visit note etc.  Would you mind contacting patient with the recommendation and sending the stuff to them?  From the google, here is their contact info.  [facility]  [address] [facility], 4th floor  Phone [phone]  Fax:[phone]    Let me know if you have questions :-)  Thank you!  Tanvi        Previous Messages       ----- Message -----  From: Marcella Geronimo LICSW  Sent: 8/27/2024   2:25 PM CDT  To: Marge Bazan, APRN CNP; [facility] Triage  Subject: out of scope- care coordination referral            HelloMarge placed a care coordination referral for Nyeoti. She has [employer] which is out of scope so triage will need to take this one. Looks like she has a hx of breast cancer (2022) and was unsatisfied with her care at [facility] and wants to seek a different provider. A referral was placed for [facility]. In chart review it says:    Right breast cancer diagnosis in 2022: Not currently following with [facility]. Went to [facility] last year. Wasn't satisfied with care. Does not want to do chemo or surgery. Early detection and said insurance wouldn't cover it. Went thought all the tests and just wanted lump out. Has seen Cristina Baumann    Please connect with her. I do see she applied for [employer] in the past for transportation and she lives in a 55+ plus building.    -ALEXI Mcduffie    *covering for Dejah this week  [phone]

## 2024-09-20 DIAGNOSIS — K58.1 IRRITABLE BOWEL SYNDROME WITH CONSTIPATION: Primary | ICD-10-CM

## 2024-09-24 RX ORDER — POLYETHYLENE GLYCOL 3350 17 G/17G
1 POWDER, FOR SOLUTION ORAL DAILY PRN
Qty: 850 G | Refills: 1 | Status: SHIPPED | OUTPATIENT
Start: 2024-09-24

## 2024-09-24 NOTE — CONFIDENTIAL NOTE
Med: MIRALADOUGLAS    LOV (related): 8/26/24 - CPX      Due for F/U around: 8/2025 FOR CPX    Next Appt:  NONE

## 2025-04-22 ENCOUNTER — TELEPHONE (OUTPATIENT)
Dept: FAMILY MEDICINE | Facility: CLINIC | Age: 70
End: 2025-04-22